# Patient Record
Sex: FEMALE | Race: WHITE | NOT HISPANIC OR LATINO | URBAN - METROPOLITAN AREA
[De-identification: names, ages, dates, MRNs, and addresses within clinical notes are randomized per-mention and may not be internally consistent; named-entity substitution may affect disease eponyms.]

---

## 2017-08-02 ENCOUNTER — EMERGENCY (EMERGENCY)
Facility: HOSPITAL | Age: 22
LOS: 1 days | Discharge: HOME | End: 2017-08-02
Admitting: EMERGENCY MEDICINE
Payer: COMMERCIAL

## 2017-08-02 VITALS
TEMPERATURE: 98 F | HEART RATE: 102 BPM | DIASTOLIC BLOOD PRESSURE: 92 MMHG | OXYGEN SATURATION: 96 % | RESPIRATION RATE: 20 BRPM | SYSTOLIC BLOOD PRESSURE: 142 MMHG

## 2017-08-02 DIAGNOSIS — J03.90 ACUTE TONSILLITIS, UNSPECIFIED: ICD-10-CM

## 2017-08-02 LAB — HCG SERPL-ACNC: <1 MIU/ML — SIGNIFICANT CHANGE UP

## 2017-08-02 PROCEDURE — 99284 EMERGENCY DEPT VISIT MOD MDM: CPT

## 2017-08-02 RX ORDER — IBUPROFEN 200 MG
1 TABLET ORAL
Qty: 20 | Refills: 0 | OUTPATIENT
Start: 2017-08-02

## 2017-08-02 RX ORDER — DEXAMETHASONE 0.5 MG/5ML
8 ELIXIR ORAL ONCE
Qty: 0 | Refills: 0 | Status: COMPLETED | OUTPATIENT
Start: 2017-08-02 | End: 2017-08-02

## 2017-08-02 RX ORDER — PENICILLIN V POTASSIUM 250 MG
1 TABLET ORAL
Qty: 14 | Refills: 0 | OUTPATIENT
Start: 2017-08-02 | End: 2017-08-09

## 2017-08-02 RX ORDER — KETOROLAC TROMETHAMINE 30 MG/ML
30 SYRINGE (ML) INJECTION ONCE
Qty: 0 | Refills: 0 | Status: DISCONTINUED | OUTPATIENT
Start: 2017-08-02 | End: 2017-08-02

## 2017-08-02 RX ADMIN — Medication 30 MILLIGRAM(S): at 21:34

## 2017-08-02 RX ADMIN — Medication 8 MILLIGRAM(S): at 21:34

## 2017-08-02 NOTE — ED PROVIDER NOTE - OBJECTIVE STATEMENT
23 yo F with a hx of tonsillitis, mono presents c/o tonsilitis since yesterday. Pt states this would be the 3rd episode of tonsilitis in the past 1.5 months. Pt states she goes to urgent care and gets prescribed ibuprofen, amoxicillin and a steroid, which she cannot recall the name of. Has not been tested for mono. Has been in contact with a person that has tonsilitis. No fever, or chills.

## 2017-08-02 NOTE — ED ADULT NURSE NOTE - OBJECTIVE STATEMENT
Pt presents to ED with c/o recurrent tonsillitis- goes to [facility] for eval, has never been tested for mononucleosis.   Tolerating secretions, able to take PO medication, resps even and unlabored.

## 2017-08-02 NOTE — ED PROVIDER NOTE - MEDICAL DECISION MAKING DETAILS
The scribe's documentation has been prepared under my direction and personally reviewed by me in its entirety. I confirm that the note above accurately reflects all work, treatment, procedures, and medical decision making performed by me. - PRAVIN Christensen The scribe's documentation has been prepared under my direction and personally reviewed by me in its entirety. I confirm that the note above accurately reflects all work, treatment, procedures, and medical decision making performed by me. - PRAVIN Christensen    Recurring tonsillitis. Will send throat culture and mono test. Will cover with abx and give IM toraldol and PO decadron here. Pt will follow up with PMD

## 2017-08-02 NOTE — ED PROVIDER NOTE - ENMT, MLM
Airway patent, Nasal mucosa clear. Mouth with normal mucosa. Throat has no vesicles, no oropharyngeal exudates and uvula is midline. Airway patent,  Mouth with normal mucosa. Throat has no vesicles, no oropharyngeal exudates and uvula is midline. 2+ tonsils bilaterally,  tolerating secretions, speaking in full sentences with no hoarse voice, no lymphadenopathy.

## 2017-08-02 NOTE — ED ADULT TRIAGE NOTE - CHIEF COMPLAINT QUOTE
Walkin patient with complaints of sore throat and cough since this morning. P/s she's been taking abx for tonsilitis on and off for about a month. Denies any fever at home today. No JONATAN or SOB.

## 2017-08-03 LAB — HETEROPH AB TITR SER AGGL: NEGATIVE — SIGNIFICANT CHANGE UP

## 2017-08-08 NOTE — ED POST DISCHARGE NOTE - OTHER COMMUNICATION
Spoke with patient after her identity was confirmed and provided her with her test results as they were negative

## 2019-08-12 NOTE — ED ADULT NURSE NOTE - NS ED NURSE LEVEL OF CONSCIOUSNESS ORIENTATION
NEW ER ADMIT, ARRIVE TO ROOM 313 APPROX 1200. DX CVA. SHE IS A/O X4, STATE NO
N/T EXTREMITIES, NO PAIN/DISCOMFORT. SHE STATE EXCESS SALIVA & DIFFICULTY
SPEAKING, STATE THIS AM NOTICED L FACIAL DROOP. @ THIS TIME WORDS CONTINUE
SOMEWHAT SLURRED, MILD-NO FACIAL DROOP. MADDY. /DORSIFLEX =/STRONG.
COMMANDS ALL EXTREMITIES. VSS. Oriented - self; Oriented - place; Oriented - time